# Patient Record
Sex: FEMALE | Race: WHITE | NOT HISPANIC OR LATINO
[De-identification: names, ages, dates, MRNs, and addresses within clinical notes are randomized per-mention and may not be internally consistent; named-entity substitution may affect disease eponyms.]

---

## 2017-02-03 ENCOUNTER — APPOINTMENT (OUTPATIENT)
Dept: POPULATION HEALTH | Facility: CLINIC | Age: 32
End: 2017-02-03

## 2018-02-02 ENCOUNTER — APPOINTMENT (OUTPATIENT)
Dept: POPULATION HEALTH | Facility: CLINIC | Age: 33
End: 2018-02-02
Payer: COMMERCIAL

## 2018-02-02 PROCEDURE — 99212 OFFICE O/P EST SF 10 MIN: CPT | Mod: 25

## 2019-01-22 ENCOUNTER — APPOINTMENT (OUTPATIENT)
Dept: POPULATION HEALTH | Facility: CLINIC | Age: 34
End: 2019-01-22
Payer: COMMERCIAL

## 2019-01-22 PROCEDURE — 86580 TB INTRADERMAL TEST: CPT

## 2020-10-09 ENCOUNTER — ASOB RESULT (OUTPATIENT)
Age: 35
End: 2020-10-09

## 2020-10-09 ENCOUNTER — APPOINTMENT (OUTPATIENT)
Dept: ANTEPARTUM | Facility: CLINIC | Age: 35
End: 2020-10-09
Payer: COMMERCIAL

## 2020-10-09 ENCOUNTER — TRANSCRIPTION ENCOUNTER (OUTPATIENT)
Age: 35
End: 2020-10-09

## 2020-10-09 PROCEDURE — 36416 COLLJ CAPILLARY BLOOD SPEC: CPT

## 2020-10-09 PROCEDURE — 76801 OB US < 14 WKS SINGLE FETUS: CPT

## 2020-10-09 PROCEDURE — 76813 OB US NUCHAL MEAS 1 GEST: CPT

## 2020-12-05 ENCOUNTER — ASOB RESULT (OUTPATIENT)
Age: 35
End: 2020-12-05

## 2020-12-05 ENCOUNTER — APPOINTMENT (OUTPATIENT)
Dept: ANTEPARTUM | Facility: CLINIC | Age: 35
End: 2020-12-05
Payer: COMMERCIAL

## 2020-12-05 PROCEDURE — 99072 ADDL SUPL MATRL&STAF TM PHE: CPT

## 2020-12-05 PROCEDURE — 76811 OB US DETAILED SNGL FETUS: CPT

## 2020-12-17 ENCOUNTER — ASOB RESULT (OUTPATIENT)
Age: 35
End: 2020-12-17

## 2020-12-17 ENCOUNTER — APPOINTMENT (OUTPATIENT)
Dept: ANTEPARTUM | Facility: CLINIC | Age: 35
End: 2020-12-17
Payer: COMMERCIAL

## 2020-12-17 PROCEDURE — 76816 OB US FOLLOW-UP PER FETUS: CPT

## 2020-12-17 PROCEDURE — 99072 ADDL SUPL MATRL&STAF TM PHE: CPT

## 2021-02-04 ENCOUNTER — APPOINTMENT (OUTPATIENT)
Dept: ANTEPARTUM | Facility: CLINIC | Age: 36
End: 2021-02-04

## 2021-04-06 ENCOUNTER — ASOB RESULT (OUTPATIENT)
Age: 36
End: 2021-04-06

## 2021-04-06 ENCOUNTER — APPOINTMENT (OUTPATIENT)
Dept: ANTEPARTUM | Facility: HOSPITAL | Age: 36
End: 2021-04-06

## 2021-04-06 ENCOUNTER — APPOINTMENT (OUTPATIENT)
Dept: ANTEPARTUM | Facility: CLINIC | Age: 36
End: 2021-04-06
Payer: COMMERCIAL

## 2021-04-06 PROCEDURE — 76819 FETAL BIOPHYS PROFIL W/O NST: CPT

## 2021-04-06 PROCEDURE — 76816 OB US FOLLOW-UP PER FETUS: CPT

## 2021-04-06 PROCEDURE — 99072 ADDL SUPL MATRL&STAF TM PHE: CPT

## 2021-04-24 ENCOUNTER — APPOINTMENT (OUTPATIENT)
Dept: DISASTER EMERGENCY | Facility: CLINIC | Age: 36
End: 2021-04-24

## 2021-04-24 ENCOUNTER — LABORATORY RESULT (OUTPATIENT)
Age: 36
End: 2021-04-24

## 2021-04-24 DIAGNOSIS — Z01.818 ENCOUNTER FOR OTHER PREPROCEDURAL EXAMINATION: ICD-10-CM

## 2021-04-28 ENCOUNTER — INPATIENT (INPATIENT)
Facility: HOSPITAL | Age: 36
LOS: 1 days | Discharge: HOME | End: 2021-04-30
Attending: OBSTETRICS & GYNECOLOGY | Admitting: OBSTETRICS & GYNECOLOGY
Payer: COMMERCIAL

## 2021-04-28 VITALS — DIASTOLIC BLOOD PRESSURE: 79 MMHG | SYSTOLIC BLOOD PRESSURE: 115 MMHG | HEART RATE: 85 BPM

## 2021-04-28 DIAGNOSIS — O48.0 POST-TERM PREGNANCY: ICD-10-CM

## 2021-04-28 LAB
BASOPHILS # BLD AUTO: 0.02 K/UL — SIGNIFICANT CHANGE UP (ref 0–0.2)
BASOPHILS NFR BLD AUTO: 0.2 % — SIGNIFICANT CHANGE UP (ref 0–2)
EOSINOPHIL # BLD AUTO: 0.04 K/UL — SIGNIFICANT CHANGE UP (ref 0–0.5)
EOSINOPHIL NFR BLD AUTO: 0.3 % — SIGNIFICANT CHANGE UP (ref 0–6)
HCT VFR BLD CALC: 37.9 % — SIGNIFICANT CHANGE UP (ref 34.5–45)
HGB BLD-MCNC: 12.8 G/DL — SIGNIFICANT CHANGE UP (ref 11.5–15.5)
IANC: 8.78 K/UL — HIGH (ref 1.5–8.5)
IMM GRANULOCYTES NFR BLD AUTO: 0.7 % — SIGNIFICANT CHANGE UP (ref 0–1.5)
LYMPHOCYTES # BLD AUTO: 1.71 K/UL — SIGNIFICANT CHANGE UP (ref 1–3.3)
LYMPHOCYTES # BLD AUTO: 14.9 % — SIGNIFICANT CHANGE UP (ref 13–44)
MCHC RBC-ENTMCNC: 33.2 PG — SIGNIFICANT CHANGE UP (ref 27–34)
MCHC RBC-ENTMCNC: 33.8 GM/DL — SIGNIFICANT CHANGE UP (ref 32–36)
MCV RBC AUTO: 98.4 FL — SIGNIFICANT CHANGE UP (ref 80–100)
MONOCYTES # BLD AUTO: 0.86 K/UL — SIGNIFICANT CHANGE UP (ref 0–0.9)
MONOCYTES NFR BLD AUTO: 7.5 % — SIGNIFICANT CHANGE UP (ref 2–14)
NEUTROPHILS # BLD AUTO: 8.78 K/UL — HIGH (ref 1.8–7.4)
NEUTROPHILS NFR BLD AUTO: 76.4 % — SIGNIFICANT CHANGE UP (ref 43–77)
NRBC # BLD: 0 /100 WBCS — SIGNIFICANT CHANGE UP
NRBC # FLD: 0 K/UL — SIGNIFICANT CHANGE UP
PLATELET # BLD AUTO: 269 K/UL — SIGNIFICANT CHANGE UP (ref 150–400)
RBC # BLD: 3.85 M/UL — SIGNIFICANT CHANGE UP (ref 3.8–5.2)
RBC # FLD: 13.2 % — SIGNIFICANT CHANGE UP (ref 10.3–14.5)
RH IG SCN BLD-IMP: NEGATIVE — SIGNIFICANT CHANGE UP
WBC # BLD: 11.49 K/UL — HIGH (ref 3.8–10.5)
WBC # FLD AUTO: 11.49 K/UL — HIGH (ref 3.8–10.5)

## 2021-04-28 PROCEDURE — 86077 PHYS BLOOD BANK SERV XMATCH: CPT

## 2021-04-28 RX ORDER — SODIUM CHLORIDE 9 MG/ML
1000 INJECTION, SOLUTION INTRAVENOUS
Refills: 0 | Status: DISCONTINUED | OUTPATIENT
Start: 2021-04-28 | End: 2021-04-28

## 2021-04-28 RX ORDER — OXYTOCIN 10 UNIT/ML
333.33 VIAL (ML) INJECTION
Qty: 20 | Refills: 0 | Status: COMPLETED | OUTPATIENT
Start: 2021-04-28 | End: 2021-04-29

## 2021-04-28 RX ORDER — LEVOTHYROXINE SODIUM 125 MCG
25 TABLET ORAL DAILY
Refills: 0 | Status: DISCONTINUED | OUTPATIENT
Start: 2021-04-28 | End: 2021-04-30

## 2021-04-28 RX ORDER — CITRIC ACID/SODIUM CITRATE 300-500 MG
15 SOLUTION, ORAL ORAL EVERY 6 HOURS
Refills: 0 | Status: DISCONTINUED | OUTPATIENT
Start: 2021-04-28 | End: 2021-04-28

## 2021-04-28 RX ORDER — SODIUM CHLORIDE 9 MG/ML
1000 INJECTION, SOLUTION INTRAVENOUS
Refills: 0 | Status: DISCONTINUED | OUTPATIENT
Start: 2021-04-28 | End: 2021-04-29

## 2021-04-28 RX ORDER — OXYTOCIN 10 UNIT/ML
333.33 VIAL (ML) INJECTION
Qty: 20 | Refills: 0 | Status: DISCONTINUED | OUTPATIENT
Start: 2021-04-28 | End: 2021-04-28

## 2021-04-28 RX ORDER — CITRIC ACID/SODIUM CITRATE 300-500 MG
15 SOLUTION, ORAL ORAL EVERY 6 HOURS
Refills: 0 | Status: DISCONTINUED | OUTPATIENT
Start: 2021-04-28 | End: 2021-04-29

## 2021-04-28 RX ADMIN — SODIUM CHLORIDE 125 MILLILITER(S): 9 INJECTION, SOLUTION INTRAVENOUS at 21:30

## 2021-04-28 NOTE — OB PROVIDER H&P - NSHPPHYSICALEXAM_GEN_ALL_CORE
VS  T(C): 36.8 (04-28-21 @ 18:02)  HR: 85 (04-28-21 @ 18:02)  BP: 115/79 (04-28-21 @ 18:02)  RR: 16 (04-28-21 @ 18:02)  SpO2: --    Gen: A&Ox3, NAD  CV: RRR  Pulm: No increased work of breathing   Abd: Soft, nontender, gravid     SVE: 2/70/-3   Sono: Vertex

## 2021-04-28 NOTE — OB PROVIDER LABOR PROGRESS NOTE - ASSESSMENT
LT IOL  - making cervical change on her own  - maternal resuscitation  - continuous monitoring  - will hold off on PO cytotec for now    d/w Dr. Shyanne Doe PGY1

## 2021-04-28 NOTE — OB PROVIDER H&P - HISTORY OF PRESENT ILLNESS
36y  at 41w2d admitted for scheduled late term IOL. Patient denies contractions. Notes regular fetal movement. Denies vaginal bleeding, loss of fluid.     PNC: Uncomplicated  GBS: Negative   EFW: 3800g     PMHx: Childhood asthma (hospitalized, never intubated), Hypothyroidism   PSHx: Denies  Meds: PNV, Synthroid 25mcg  Allergies: NKDA

## 2021-04-28 NOTE — OB PROVIDER LABOR PROGRESS NOTE - ASSESSMENT
LT IOL sp 4 min decel 2 hours ago  - FHT Cat1   - induce with PO cytotec  - continuous monitoring    d/w Dr. Shyanne Doe PGY1

## 2021-04-28 NOTE — OB PROVIDER H&P - ASSESSMENT
35y/o  at 41w2d admitted for LTIOL:    - For PO Cyototec to begin IOL    - Continuous EFM/Nedrow    - COVID-19 PCR sent, partner COVID-19 PCR sent as well    - GBS: Negative    - Routine IVF    - Routine labs     d/w Dr. Shyanne Iqbal, PGY-2

## 2021-04-29 LAB
BLD GP AB SCN SERPL QL: POSITIVE — SIGNIFICANT CHANGE UP
COVID-19 SPIKE DOMAIN AB INTERP: NEGATIVE — SIGNIFICANT CHANGE UP
COVID-19 SPIKE DOMAIN ANTIBODY RESULT: 0.4 U/ML — SIGNIFICANT CHANGE UP
RH IG SCN BLD-IMP: NEGATIVE — SIGNIFICANT CHANGE UP
SARS-COV-2 IGG+IGM SERPL QL IA: 0.4 U/ML — SIGNIFICANT CHANGE UP
SARS-COV-2 IGG+IGM SERPL QL IA: NEGATIVE — SIGNIFICANT CHANGE UP
SARS-COV-2 RNA SPEC QL NAA+PROBE: SIGNIFICANT CHANGE UP
T PALLIDUM AB TITR SER: NEGATIVE — SIGNIFICANT CHANGE UP

## 2021-04-29 RX ORDER — ACETAMINOPHEN 500 MG
3 TABLET ORAL
Qty: 0 | Refills: 0 | DISCHARGE
Start: 2021-04-29

## 2021-04-29 RX ORDER — DIBUCAINE 1 %
1 OINTMENT (GRAM) RECTAL EVERY 6 HOURS
Refills: 0 | Status: DISCONTINUED | OUTPATIENT
Start: 2021-04-29 | End: 2021-04-30

## 2021-04-29 RX ORDER — MAGNESIUM HYDROXIDE 400 MG/1
30 TABLET, CHEWABLE ORAL
Refills: 0 | Status: DISCONTINUED | OUTPATIENT
Start: 2021-04-29 | End: 2021-04-30

## 2021-04-29 RX ORDER — IBUPROFEN 200 MG
1 TABLET ORAL
Qty: 0 | Refills: 0 | DISCHARGE
Start: 2021-04-29

## 2021-04-29 RX ORDER — ACETAMINOPHEN 500 MG
975 TABLET ORAL
Refills: 0 | Status: DISCONTINUED | OUTPATIENT
Start: 2021-04-29 | End: 2021-04-30

## 2021-04-29 RX ORDER — SIMETHICONE 80 MG/1
80 TABLET, CHEWABLE ORAL EVERY 4 HOURS
Refills: 0 | Status: DISCONTINUED | OUTPATIENT
Start: 2021-04-29 | End: 2021-04-30

## 2021-04-29 RX ORDER — DIPHENHYDRAMINE HCL 50 MG
25 CAPSULE ORAL EVERY 6 HOURS
Refills: 0 | Status: DISCONTINUED | OUTPATIENT
Start: 2021-04-29 | End: 2021-04-30

## 2021-04-29 RX ORDER — KETOROLAC TROMETHAMINE 30 MG/ML
30 SYRINGE (ML) INJECTION ONCE
Refills: 0 | Status: DISCONTINUED | OUTPATIENT
Start: 2021-04-29 | End: 2021-04-29

## 2021-04-29 RX ORDER — HYDROCORTISONE 1 %
1 OINTMENT (GRAM) TOPICAL EVERY 6 HOURS
Refills: 0 | Status: DISCONTINUED | OUTPATIENT
Start: 2021-04-29 | End: 2021-04-30

## 2021-04-29 RX ORDER — OXYTOCIN 10 UNIT/ML
2 VIAL (ML) INJECTION
Qty: 30 | Refills: 0 | Status: DISCONTINUED | OUTPATIENT
Start: 2021-04-29 | End: 2021-04-29

## 2021-04-29 RX ORDER — AER TRAVELER 0.5 G/1
1 SOLUTION RECTAL; TOPICAL EVERY 4 HOURS
Refills: 0 | Status: DISCONTINUED | OUTPATIENT
Start: 2021-04-29 | End: 2021-04-30

## 2021-04-29 RX ORDER — OXYCODONE HYDROCHLORIDE 5 MG/1
5 TABLET ORAL
Refills: 0 | Status: DISCONTINUED | OUTPATIENT
Start: 2021-04-29 | End: 2021-04-30

## 2021-04-29 RX ORDER — OXYCODONE HYDROCHLORIDE 5 MG/1
5 TABLET ORAL ONCE
Refills: 0 | Status: DISCONTINUED | OUTPATIENT
Start: 2021-04-29 | End: 2021-04-30

## 2021-04-29 RX ORDER — TETANUS TOXOID, REDUCED DIPHTHERIA TOXOID AND ACELLULAR PERTUSSIS VACCINE, ADSORBED 5; 2.5; 8; 8; 2.5 [IU]/.5ML; [IU]/.5ML; UG/.5ML; UG/.5ML; UG/.5ML
0.5 SUSPENSION INTRAMUSCULAR ONCE
Refills: 0 | Status: DISCONTINUED | OUTPATIENT
Start: 2021-04-29 | End: 2021-04-30

## 2021-04-29 RX ORDER — LANOLIN
1 OINTMENT (GRAM) TOPICAL EVERY 6 HOURS
Refills: 0 | Status: DISCONTINUED | OUTPATIENT
Start: 2021-04-29 | End: 2021-04-30

## 2021-04-29 RX ORDER — SODIUM CHLORIDE 9 MG/ML
3 INJECTION INTRAMUSCULAR; INTRAVENOUS; SUBCUTANEOUS EVERY 8 HOURS
Refills: 0 | Status: DISCONTINUED | OUTPATIENT
Start: 2021-04-29 | End: 2021-04-30

## 2021-04-29 RX ORDER — IBUPROFEN 200 MG
600 TABLET ORAL EVERY 6 HOURS
Refills: 0 | Status: COMPLETED | OUTPATIENT
Start: 2021-04-29 | End: 2022-03-28

## 2021-04-29 RX ORDER — IBUPROFEN 200 MG
600 TABLET ORAL EVERY 6 HOURS
Refills: 0 | Status: DISCONTINUED | OUTPATIENT
Start: 2021-04-29 | End: 2021-04-30

## 2021-04-29 RX ORDER — PRAMOXINE HYDROCHLORIDE 150 MG/15G
1 AEROSOL, FOAM RECTAL EVERY 4 HOURS
Refills: 0 | Status: DISCONTINUED | OUTPATIENT
Start: 2021-04-29 | End: 2021-04-30

## 2021-04-29 RX ORDER — BENZOCAINE 10 %
1 GEL (GRAM) MUCOUS MEMBRANE EVERY 6 HOURS
Refills: 0 | Status: DISCONTINUED | OUTPATIENT
Start: 2021-04-29 | End: 2021-04-30

## 2021-04-29 RX ADMIN — Medication 600 MILLIGRAM(S): at 20:10

## 2021-04-29 RX ADMIN — Medication 975 MILLIGRAM(S): at 20:40

## 2021-04-29 RX ADMIN — Medication 1000 MILLIUNIT(S)/MIN: at 15:24

## 2021-04-29 RX ADMIN — Medication 0.25 MILLIGRAM(S): at 02:58

## 2021-04-29 RX ADMIN — Medication 30 MILLIGRAM(S): at 15:20

## 2021-04-29 RX ADMIN — Medication 2 MILLIUNIT(S)/MIN: at 05:38

## 2021-04-29 RX ADMIN — Medication 30 MILLIGRAM(S): at 14:20

## 2021-04-29 RX ADMIN — Medication 600 MILLIGRAM(S): at 20:40

## 2021-04-29 RX ADMIN — Medication 25 MICROGRAM(S): at 09:42

## 2021-04-29 RX ADMIN — Medication 975 MILLIGRAM(S): at 20:09

## 2021-04-29 NOTE — OB PROVIDER LABOR PROGRESS NOTE - ASSESSMENT
ISE/IUPC placed for closer monitoring Terb administered for tetanic ctx. OR opened. Dr. Ferguson called to beside.     35 y/o  @41+3 LTIOL IOL with Cat II tracing s/p now 3 doses of terb    -Continue resuscitation efforts  -S/P ephedrine  -Considers amnioinfusion if decels persist  -DW Dr. Ferguson at bedside    MARTIN grande ISE/IUPC placed for closer monitoring Terb administered for tetanic ctx. OR opened. Dr. Ferguson called to beside.     35 y/o  @41+3 LTIOL IOL with Cat II tracing s/p now 3 doses of terb    -Continue resuscitation efforts  -S/P ephedrine  -Considers amnioinfusion if decels persist  -DW Dr. Ferguson at bedside/ pt counselled on possible  if Cat II tracing persists and unable to augment/remote from delivery    MARTIN grande

## 2021-04-29 NOTE — OB NEONATOLOGY/PEDIATRICIAN DELIVERY SUMMARY - NSPEDSNEONOTESA_OBGYN_ALL_OB_FT
Baby boy born at 41.3 wks via  to a 35 y/o  O- blood type mother. Maternal history of hypothyroidism. Prenatal history unremarkable. PNL nr/immune/-, GBS - on . SROM at 0700 with clear fluids. Baby emerged vigorous, crying, was w/d/s/s with APGARS of 8,9. Mom would like to breast feed, consents to Hep B and consents to circ.  PCP: [facility].

## 2021-04-29 NOTE — OB RN DELIVERY SUMMARY - NSSELHIDDEN_OBGYN_ALL_OB_FT
[NS_DeliveryAttending1_OBGYN_ALL_OB_FT:[id number]],[NS_DeliveryRN_OBGYN_ALL_OB_FT:[id number]]

## 2021-04-29 NOTE — OB RN DELIVERY SUMMARY - NS_SEPSISRSKCALC_OBGYN_ALL_OB_FT
EOS calculated successfully. EOS Risk Factor: 0.5/1000 live births ([facility] national incidence); GA=41w3d; Temp=98.96; ROM=6.1; GBS='Negative'; Antibiotics='No antibiotics or any antibiotics < 2 hrs prior to birth'

## 2021-04-29 NOTE — OB PROVIDER DELIVERY SUMMARY - NSPROVIDERDELIVERYNOTE_OBGYN_ALL_OB_FT
of male infant. Head, body and shoulders delivered without incident. Cord noted around body .  Cord gases obtained. Weight 7'2". Apgars 8/9 Placenta delivered without incident. 2nd degree laceration noted and repaired with 2 chromic.

## 2021-04-29 NOTE — LACTATION INITIAL EVALUATION - INTERVENTION OUTCOME
Mom educated about babies less than 24 hours of age will be sleepy. Made aware of cluster feeding that occurs after 24 hours of life and to be cautious of sleep deprivation in order to maintain infant and mother safety. Instructed to place infant in bassinet or call for assistance if feeling sleepy or tired.Recognition of feeding cues and to feed the baby on demand based on cues at least 8-12 times in a day. Instructed pt. to wake the baby to feed if no feeding cues are seen within 3h since prior feed. Pt. educated on the nutritional needs of the baby, how many wet and dirty diapers to expect, along with the amount of times the baby needs to be placed on the breast at this time.  use  feeding log to record feedings along with wet and dirty diapers. instructed in hand expression with good return demonstration.  Reviewed safe skin to skin.  Verbalized understanding of education./verbalizes understanding/demonstrates understanding of teaching

## 2021-04-29 NOTE — OB PROVIDER LABOR PROGRESS NOTE - NS_SUBJECTIVE/OBJECTIVE_OBGYN_ALL_OB_FT
Pt examined for cervical change
Patient seen bedside for recurrent late and variable decelerations
Pt examined at bedside for 4minute deceleration that spontaneously returned to baseline
Called overhead for prolonged decel x7 min. Pitocin turned off. IV/O2/repositioning in progress. S/P recent top off with BPs 90s-100s/50s-70s
patient evaluated bedside for prolonged deceleration x4-5min. Terbutaline x1 administered.

## 2021-04-29 NOTE — LACTATION INITIAL EVALUATION - LACTATION INTERVENTIONS
Patient made aware of breastfeeding support group and given the breastfeeding support group flyer with phone number. Pt. informed of interactive learning consuelo available to use with the Guide to Postpartum and  Care book. Interactive component demonstrated utilizing the section for hand expression./initiate skin to skin/initiate/review early breastfeeding management guidelines/initiate/review techniques for position and latch/post discharge community resources provided

## 2021-04-29 NOTE — OB PROVIDER LABOR PROGRESS NOTE - ASSESSMENT
patient is a 35yo  @41w3d LT IOL, course c/b CAT II FHT. Now s/p 2x doses of PO cytotec, last @ 230a. Continue resuscitative measures. Likely for pitocin @430a pending tracing recovery    Gabbie Naidu, PGY3  Dr. Kimble bedside

## 2021-04-29 NOTE — OB PROVIDER LABOR PROGRESS NOTE - ASSESSMENT
36 y.o.  at 41w3d presenting for late term induction of labor s/p po, received terb x 2 on pitocin of 6 currently. Patient is fully dilated but not feeling the urge to push     Plan   - Anticipate    - Continue resuscitative measures     d/w Dr. Phyllis Moseley PGY-1

## 2021-04-29 NOTE — OB PROVIDER LABOR PROGRESS NOTE - NS_OBIHIFHRDETAILS_OBGYN_ALL_OB_FT
130/mod jong/- accels/late decels
Cat 1
120, mod, +accel, sp 4 min deceleration
CAT II FHT  115-120BPM  moderate variability  +accels  1x prolonged deceleration
Baseline 135, moderate variability, accelerations present, variable and late decelerations with recovery

## 2021-04-29 NOTE — CHART NOTE - NSCHARTNOTEFT_GEN_A_CORE
Patient seen at bedside   Resting without complaints   Tracing category 1   Naylor: irregular   VE: 7/80/-2   Reassess as needed

## 2021-04-30 ENCOUNTER — TRANSCRIPTION ENCOUNTER (OUTPATIENT)
Age: 36
End: 2021-04-30

## 2021-04-30 VITALS
TEMPERATURE: 98 F | HEART RATE: 94 BPM | SYSTOLIC BLOOD PRESSURE: 120 MMHG | DIASTOLIC BLOOD PRESSURE: 71 MMHG | OXYGEN SATURATION: 99 % | RESPIRATION RATE: 18 BRPM

## 2021-04-30 LAB — KLEIHAUER-BETKE CALCULATION: 0 % — SIGNIFICANT CHANGE UP

## 2021-04-30 RX ORDER — GLYCERIN ADULT
1 SUPPOSITORY, RECTAL RECTAL ONCE
Refills: 0 | Status: DISCONTINUED | OUTPATIENT
Start: 2021-04-30 | End: 2021-04-30

## 2021-04-30 RX ORDER — SENNA PLUS 8.6 MG/1
1 TABLET ORAL EVERY 12 HOURS
Refills: 0 | Status: DISCONTINUED | OUTPATIENT
Start: 2021-04-30 | End: 2021-04-30

## 2021-04-30 RX ADMIN — Medication 600 MILLIGRAM(S): at 02:30

## 2021-04-30 RX ADMIN — MAGNESIUM HYDROXIDE 30 MILLILITER(S): 400 TABLET, CHEWABLE ORAL at 10:26

## 2021-04-30 RX ADMIN — Medication 1 TABLET(S): at 14:04

## 2021-04-30 RX ADMIN — Medication 600 MILLIGRAM(S): at 14:34

## 2021-04-30 RX ADMIN — Medication 600 MILLIGRAM(S): at 14:04

## 2021-04-30 RX ADMIN — Medication 25 MICROGRAM(S): at 06:33

## 2021-04-30 RX ADMIN — Medication 600 MILLIGRAM(S): at 07:52

## 2021-04-30 RX ADMIN — Medication 600 MILLIGRAM(S): at 02:04

## 2021-04-30 RX ADMIN — SODIUM CHLORIDE 3 MILLILITER(S): 9 INJECTION INTRAMUSCULAR; INTRAVENOUS; SUBCUTANEOUS at 06:33

## 2021-04-30 RX ADMIN — Medication 600 MILLIGRAM(S): at 08:22

## 2021-04-30 NOTE — PROGRESS NOTE ADULT - SUBJECTIVE AND OBJECTIVE BOX
Post-partum Note,   She is a  36y woman who is now post-partum day: 1    Subjective:  The patient feels well.  She is ambulating.   She is tolerating regular diet.  She denies nausea and vomiting; denies fever.  She is voiding.  Her pain is controlled.  She reports normal postpartum bleeding.  She is breastfeeding.  She is formula feeding.    Physical exam:    Vital Signs Last 24 Hrs  T(C): 37 (2021 06:00), Max: 37 (2021 06:00)  T(F): 98.6 (2021 06:00), Max: 98.6 (2021 06:00)  HR: 85 (2021 06:00) (49 - 125)  BP: 101/60 (2021 06:00) (93/51 - 123/69)  BP(mean): --  RR: 18 (2021 06:00) (18 - 18)  SpO2: 99% (2021 06:00) (93% - 100%)    Gen: NAD  Breast: Soft, nontender, not engorged.  Abdomen: Soft, nontender, no distension , firm uterine fundus at umbilicus.  Pelvic: Normal lochia noted  Ext: No calf tenderness    LABS:                        12.8   11.49 )-----------( 269      ( 2021 21:51 )             37.9       Rubella status:     Allergies    No Known Allergies    Intolerances      MEDICATIONS  (STANDING):  acetaminophen   Tablet .. 975 milliGRAM(s) Oral <User Schedule>  diphtheria/tetanus/pertussis (acellular) Vaccine (ADAcel) 0.5 milliLiter(s) IntraMuscular once  ibuprofen  Tablet. 600 milliGRAM(s) Oral every 6 hours  levothyroxine 25 MICROGram(s) Oral daily  prenatal multivitamin 1 Tablet(s) Oral daily  sodium chloride 0.9% lock flush 3 milliLiter(s) IV Push every 8 hours    MEDICATIONS  (PRN):  benzocaine 20%/menthol 0.5% Spray 1 Spray(s) Topical every 6 hours PRN for Perineal discomfort  dibucaine 1% Ointment 1 Application(s) Topical every 6 hours PRN Perineal discomfort  diphenhydrAMINE 25 milliGRAM(s) Oral every 6 hours PRN Pruritus  hydrocortisone 1% Cream 1 Application(s) Topical every 6 hours PRN Moderate Pain (4-6)  lanolin Ointment 1 Application(s) Topical every 6 hours PRN nipple soreness  magnesium hydroxide Suspension 30 milliLiter(s) Oral two times a day PRN Constipation  oxyCODONE    IR 5 milliGRAM(s) Oral every 3 hours PRN Moderate to Severe Pain (4-10)  oxyCODONE    IR 5 milliGRAM(s) Oral once PRN Moderate to Severe Pain (4-10)  pramoxine 1%/zinc 5% Cream 1 Application(s) Topical every 4 hours PRN Moderate Pain (4-6)  simethicone 80 milliGRAM(s) Chew every 4 hours PRN Gas  witch hazel Pads 1 Application(s) Topical every 4 hours PRN Perineal discomfort        Assessment and Plan  PPD #1 s/p   Doing well.  Encourage ambulation.  PP & PPD Instructions reviewed.  [facility].  D/C home today

## 2021-04-30 NOTE — DISCHARGE NOTE OB - PATIENT PORTAL LINK FT
You can access the FollowMyHealth Patient Portal offered by [facility] by registering at the following website: [email or web address]. By joining [employer]’s FollowMyHealth portal, you will also be able to view your health information using other applications (apps) compatible with our system.

## 2021-04-30 NOTE — DISCHARGE NOTE OB - CARE PROVIDER_API CALL
Nash Iyer)  Obstetrics and Gynecology  [address] [place] [address]  Phone: [phone]  Fax: [phone]  Follow Up Time:

## 2021-04-30 NOTE — DISCHARGE NOTE OB - CARE PLAN
Principal Discharge DX:	Delivery normal  Goal:	Recovery  Assessment and plan of treatment:	PP Care

## 2021-04-30 NOTE — DISCHARGE NOTE OB - MEDICATION SUMMARY - MEDICATIONS TO TAKE
I will START or STAY ON the medications listed below when I get home from the hospital:    ibuprofen 600 mg oral tablet  -- 1 tab(s) by mouth every 6 hours, As Needed  -- Indication: For analgesia    acetaminophen 325 mg oral tablet  -- 3 tab(s) by mouth , As Needed  -- Indication: For analgesia   I will START or STAY ON the medications listed below when I get home from the hospital:    acetaminophen 325 mg oral tablet  -- 3 tab(s) by mouth every 6 hours, As Needed  -- Indication: For Pain    ibuprofen 600 mg oral tablet  -- 1 tab(s) by mouth every 6 hours, As Needed  -- Indication: For Pain

## 2022-10-29 NOTE — OB PROVIDER H&P - ALERT: PERTINENT HISTORY
1st Trimester Sonogram/20 Week Level II Sonogram/BioPhysical Profile(s)/Fetal Non-Stress Test (NST)/Ultra Screen at 12 Weeks without difficulty

## 2023-08-31 ENCOUNTER — TRANSCRIPTION ENCOUNTER (OUTPATIENT)
Age: 38
End: 2023-08-31

## 2023-09-01 ENCOUNTER — TRANSCRIPTION ENCOUNTER (OUTPATIENT)
Age: 38
End: 2023-09-01

## 2023-09-01 ENCOUNTER — OUTPATIENT (OUTPATIENT)
Dept: OUTPATIENT SERVICES | Facility: HOSPITAL | Age: 38
LOS: 1 days | End: 2023-09-01
Payer: COMMERCIAL

## 2023-09-01 VITALS
OXYGEN SATURATION: 98 % | DIASTOLIC BLOOD PRESSURE: 66 MMHG | HEART RATE: 88 BPM | RESPIRATION RATE: 14 BRPM | SYSTOLIC BLOOD PRESSURE: 108 MMHG

## 2023-09-01 VITALS
DIASTOLIC BLOOD PRESSURE: 70 MMHG | HEIGHT: 65 IN | RESPIRATION RATE: 14 BRPM | TEMPERATURE: 98 F | OXYGEN SATURATION: 100 % | SYSTOLIC BLOOD PRESSURE: 106 MMHG | WEIGHT: 195.99 LBS | HEART RATE: 97 BPM

## 2023-09-01 DIAGNOSIS — O02.1 MISSED ABORTION: ICD-10-CM

## 2023-09-01 DIAGNOSIS — Z01.818 ENCOUNTER FOR OTHER PREPROCEDURAL EXAMINATION: ICD-10-CM

## 2023-09-01 LAB
ABO RH CONFIRMATION: SIGNIFICANT CHANGE UP
BLD GP AB SCN SERPL QL: SIGNIFICANT CHANGE UP
HCT VFR BLD CALC: 40.6 % — SIGNIFICANT CHANGE UP (ref 34.5–45)
HGB BLD-MCNC: 13.8 G/DL — SIGNIFICANT CHANGE UP (ref 11.5–15.5)
MCHC RBC-ENTMCNC: 33.3 PG — SIGNIFICANT CHANGE UP (ref 27–34)
MCHC RBC-ENTMCNC: 34 GM/DL — SIGNIFICANT CHANGE UP (ref 32–36)
MCV RBC AUTO: 97.8 FL — SIGNIFICANT CHANGE UP (ref 80–100)
NRBC # BLD: 0 /100 WBCS — SIGNIFICANT CHANGE UP (ref 0–0)
PLATELET # BLD AUTO: 286 K/UL — SIGNIFICANT CHANGE UP (ref 150–400)
RBC # BLD: 4.15 M/UL — SIGNIFICANT CHANGE UP (ref 3.8–5.2)
RBC # FLD: 12.2 % — SIGNIFICANT CHANGE UP (ref 10.3–14.5)
WBC # BLD: 9.63 K/UL — SIGNIFICANT CHANGE UP (ref 3.8–10.5)
WBC # FLD AUTO: 9.63 K/UL — SIGNIFICANT CHANGE UP (ref 3.8–10.5)

## 2023-09-01 PROCEDURE — 88305 TISSUE EXAM BY PATHOLOGIST: CPT

## 2023-09-01 PROCEDURE — 36415 COLL VENOUS BLD VENIPUNCTURE: CPT

## 2023-09-01 PROCEDURE — 85027 COMPLETE CBC AUTOMATED: CPT

## 2023-09-01 PROCEDURE — 86901 BLOOD TYPING SEROLOGIC RH(D): CPT

## 2023-09-01 PROCEDURE — 59820 CARE OF MISCARRIAGE: CPT

## 2023-09-01 PROCEDURE — 86900 BLOOD TYPING SEROLOGIC ABO: CPT

## 2023-09-01 PROCEDURE — 88305 TISSUE EXAM BY PATHOLOGIST: CPT | Mod: 26

## 2023-09-01 PROCEDURE — 86850 RBC ANTIBODY SCREEN: CPT

## 2023-09-01 PROCEDURE — 36430 TRANSFUSION BLD/BLD COMPNT: CPT

## 2023-09-01 RX ORDER — ONDANSETRON 8 MG/1
4 TABLET, FILM COATED ORAL ONCE
Refills: 0 | Status: DISCONTINUED | OUTPATIENT
Start: 2023-09-01 | End: 2023-09-05

## 2023-09-01 RX ORDER — OXYCODONE HYDROCHLORIDE 5 MG/1
5 TABLET ORAL ONCE
Refills: 0 | Status: DISCONTINUED | OUTPATIENT
Start: 2023-09-01 | End: 2023-09-05

## 2023-09-01 RX ORDER — HYDROMORPHONE HYDROCHLORIDE 2 MG/ML
0.5 INJECTION INTRAMUSCULAR; INTRAVENOUS; SUBCUTANEOUS
Refills: 0 | Status: DISCONTINUED | OUTPATIENT
Start: 2023-09-01 | End: 2023-09-05

## 2023-09-01 RX ORDER — ACETAMINOPHEN 500 MG
975 TABLET ORAL ONCE
Refills: 0 | Status: COMPLETED | OUTPATIENT
Start: 2023-09-01 | End: 2023-09-01

## 2023-09-01 RX ORDER — LEVOTHYROXINE SODIUM 125 MCG
1 TABLET ORAL
Refills: 0 | DISCHARGE

## 2023-09-01 RX ORDER — SODIUM CHLORIDE 9 MG/ML
1000 INJECTION, SOLUTION INTRAVENOUS
Refills: 0 | Status: DISCONTINUED | OUTPATIENT
Start: 2023-09-01 | End: 2023-09-05

## 2023-09-01 RX ADMIN — Medication 975 MILLIGRAM(S): at 14:17

## 2023-09-01 NOTE — H&P PST ADULT - ATTENDING COMMENTS
39 y/o  at 6+ weeks with MAB of twins. Patient presents today for scheduled D&C with suction.   Reviewed risks, benefits, and alternatives. Risks include but not limited to bleeding, infection, and damage to nearby organs such as bowel, bladder, and or vasculature. Discussed risk of uterine perforation and possible need for diagnostic laparoscopy. All questions and concerns were addressed to the patient's satisfaction.  Doxy and Rhogam ordered

## 2023-09-01 NOTE — H&P PST ADULT - NSANTHOSAYNRD_GEN_A_CORE
No. MANI screening performed.  STOP BANG Legend: 0-2 = LOW Risk; 3-4 = INTERMEDIATE Risk; 5-8 = HIGH Risk

## 2023-09-01 NOTE — ASU DISCHARGE PLAN (ADULT/PEDIATRIC) - ASU DC SPECIAL INSTRUCTIONSFT
Please make an appointment to be seen in 2 wks  ****Call the office with any problems including but not limited to heavy vaginal bleeding, fevers, severe abdominal pain, inability to eat/drink/urinate  **** Nothing in the vagina x2 weeks - ( No sex, tampons, douching )  *****You may shower as usual but no hot tubs, bath tubs, swimming pools x2 weeks.

## 2023-09-01 NOTE — BRIEF OPERATIVE NOTE - NSICDXBRIEFPROCEDURE_GEN_ALL_CORE_FT
PROCEDURES:  Dilation and curettage of uterus using suction with ultrasound guidance 01-Sep-2023 16:11:37  Christina Charles

## 2023-09-01 NOTE — H&P PST ADULT - HISTORY OF PRESENT ILLNESS
37 y/o female with PMH of hypothyroidism for PST having Suction Dilation and Curettage - Miss with Karyotyping by Dr. Porfirio Camarillo on 9/1/2023.  She denies any pelvic pain or abnormal vaginal bleeding.  She states was told at last visit no fetal growth/demise recommended for elective procedure.

## 2023-09-01 NOTE — BRIEF OPERATIVE NOTE - OPERATION/FINDINGS
Normal appearing vagina and cervix. Bedside ultrasound showed thin endometrial lining with no flow at the completion of the procedure

## 2023-09-01 NOTE — H&P PST ADULT - PROBLEM SELECTOR PLAN 2
Labs CBc & T&S  No Medical clearance necessary  Pre op instructions reviewed and given.   Take routine am meds with sip of water.   Instructed to hold and/or avoid other NSAIDs and OTC supplements. Tylenol is ok. Verbalized understanding

## 2023-09-01 NOTE — PRE-OP CHECKLIST - SKIN PREP
Call Regarding Preventive Health Screening Colonoscopy    Attempt 1    Message on voicemail     Comments:       Outreach   Maureen Davis       n/a

## 2023-09-01 NOTE — ASU DISCHARGE PLAN (ADULT/PEDIATRIC) - CARE PROVIDER_API CALL
Christina Charles  Obstetrics and Gynecology  [address] [place] [address]  Phone: [phone]  Fax: [phone]  Established Patient  Follow Up Time: 2 weeks

## 2023-09-01 NOTE — ASU DISCHARGE PLAN (ADULT/PEDIATRIC) - NS MD DC FALL RISK RISK
For information on Fall & Injury Prevention, visit: [email or web address] OR  [email or web address] OR  https://www.cdc.gov/steadi/patient.html

## 2023-09-06 LAB — SURGICAL PATHOLOGY STUDY: SIGNIFICANT CHANGE UP

## 2023-10-30 NOTE — DISCHARGE NOTE OB - USE WARM WATER SITZ BATH FOR RELIEF OF DISCOMFORT
Return immediately to the emergency department if you experience new or worsened symptoms, chest pain, shortness of breath, skin pulling in 1 breathing, fever, or for any other concerns. Statement Selected

## (undated) DEVICE — SOL IRR POUR NS 0.9% 1000ML

## (undated) DEVICE — PSP-SCD MACHINE: Type: DURABLE MEDICAL EQUIPMENT

## (undated) DEVICE — GLV 7 PROTEXIS (WHITE)

## (undated) DEVICE — SOL IRR POUR H2O 1000ML

## (undated) DEVICE — GLV 6.5 PROTEXIS (WHITE)

## (undated) DEVICE — VENODYNE/SCD SLEEVE CALF MEDIUM

## (undated) DEVICE — VACUUM CURETTE BERKLEY OLYMPUS CURVED 8MM

## (undated) DEVICE — CONTAINER SPECIMEN 4OZ

## (undated) DEVICE — CANISTER SUCTION 2000CC

## (undated) DEVICE — VACUUM CURETTE BERKLEY OLYMPUS F TIP 6MM

## (undated) DEVICE — VENODYNE/SCD SLEEVE CALF LARGE

## (undated) DEVICE — VACUUM CURETTE BERKLEY OLYMPUS CURVED 7MM

## (undated) DEVICE — PACK LITHOTOMY

## (undated) DEVICE — TUBING GYRUS ACMI COLLECTION SET 6FT

## (undated) DEVICE — WARMING BLANKET UPPER ADULT